# Patient Record
Sex: FEMALE | Race: WHITE | NOT HISPANIC OR LATINO | Employment: STUDENT | URBAN - METROPOLITAN AREA
[De-identification: names, ages, dates, MRNs, and addresses within clinical notes are randomized per-mention and may not be internally consistent; named-entity substitution may affect disease eponyms.]

---
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
## 2024-05-10 ENCOUNTER — MYC MEDICAL ADVICE (OUTPATIENT)
Dept: FAMILY MEDICINE | Facility: OTHER | Age: 19
End: 2024-05-10
Payer: COMMERCIAL

## 2024-05-10 DIAGNOSIS — Z11.1 SCREENING EXAMINATION FOR PULMONARY TUBERCULOSIS: Primary | ICD-10-CM

## 2024-05-14 NOTE — TELEPHONE ENCOUNTER
INCOMING FORMS    Sender:     Type of Form, letter or note (What is requested?): Health screening    How was the form received?: MyChart     How should forms be returned?:  sent patient Mychart message    Form placed in CDL bin for review/signature if appropriate.

## 2024-05-14 NOTE — TELEPHONE ENCOUNTER
Form faxed to [phone].  Copy of form sent to scanning.  Form in TC hold bin.  Mychart sent if patient wanted form back.

## 2024-05-16 NOTE — TELEPHONE ENCOUNTER
I have ordered this for her.    Chencho Zazueta PA-C  [facility]

## 2024-05-16 NOTE — TELEPHONE ENCOUNTER
Left message for pt to return our call. Can scheduled TB with MA then needs 2nd visit needs to be with an RN to read results between 48-72 hours later.

## 2024-05-16 NOTE — TELEPHONE ENCOUNTER
will you order TB testing or would you like patient to have a visit to discuss this?    Mallory Lucas RN on 5/16/2024 at 9:46 AM

## 2024-05-20 ENCOUNTER — ALLIED HEALTH/NURSE VISIT (OUTPATIENT)
Dept: FAMILY MEDICINE | Facility: CLINIC | Age: 19
End: 2024-05-20
Payer: COMMERCIAL

## 2024-05-20 DIAGNOSIS — Z11.1 SCREENING EXAMINATION FOR PULMONARY TUBERCULOSIS: Primary | ICD-10-CM

## 2024-05-20 PROCEDURE — 99207 PR NO CHARGE NURSE ONLY: CPT

## 2024-05-20 PROCEDURE — 86580 TB INTRADERMAL TEST: CPT

## 2024-05-20 NOTE — PROGRESS NOTES
"Patient is here today for a Mantoux (TST) test placement.    Is there a current order in the chart? No. Placed order according to standing order (reference the \"Skin Test- Tuberculosis Screening- Ambulatory Care\" standing order in Policy Tech). Review the Inclusion and Exclusion Criteria.        Inclusion Criteria  School or education institutional screening for healthcare workers and correctional facility staff - Administer two-step TST. Patient to return for second test in 1-3 weeks after first test is read.     Exclusion Criteria  None - Place order for Mantoux (TST) test per standing order.    Reason for Mantoux (TST) in patient's own words: shadowing doctors     Patient needs form signed? No - form not needed per patient.    Instructed patient to wait for 15 minutes post injection and to report any reactions immediately to staff.    Told patient to return to clinic in 48-72 hours to have Mantoux (TST) read.          "

## 2024-05-23 ENCOUNTER — ALLIED HEALTH/NURSE VISIT (OUTPATIENT)
Dept: FAMILY MEDICINE | Facility: CLINIC | Age: 19
End: 2024-05-23
Payer: COMMERCIAL

## 2024-05-23 DIAGNOSIS — Z11.1 SCREENING EXAMINATION FOR PULMONARY TUBERCULOSIS: Primary | ICD-10-CM

## 2024-05-23 LAB
PPDINDURATION: 0 MM (ref 0–4.99)
PPDREDNESS: NORMAL

## 2024-05-23 PROCEDURE — 99207 PR NO CHARGE NURSE ONLY: CPT

## 2024-05-23 NOTE — LETTER
May 23, 2024      Anastasia Barnard  [address] [facility] [address]        To Whom It May Concern,       Patient is here today for a Mantoux (TST) test results.    Did patient return to clinic 48-72 hours from Mantoux (TST) placement: Yes -     PPD Induration   Date Value Ref Range Status   05/23/2024 0 0 - 4.99 mm Final     PPD Redness   Date Value Ref Range Status   05/23/2024 Not Present  Final                       Sincerely,        PH Nurse

## 2024-05-23 NOTE — PROGRESS NOTES
Patient is here today for a Mantoux (TST) test results.    Did patient return to clinic 48-72 hours from Mantoux (TST) placement: Yes -     PPD Induration   Date Value Ref Range Status   05/23/2024 0 0 - 4.99 mm Final     PPD Redness   Date Value Ref Range Status   05/23/2024 Not Present  Final           Induration Size? Induration <5mm - Enter results in Enter/Edit Activity. Route results to ordering provider.     Patient needs form signed? No    Patient reports having previously had the BCG Vaccine: No    Does patient need a two step? No      Alexia Cotto,ISAURAN, RN

## 2024-07-01 ENCOUNTER — MYC MEDICAL ADVICE (OUTPATIENT)
Dept: FAMILY MEDICINE | Facility: OTHER | Age: 19
End: 2024-07-01
Payer: COMMERCIAL

## 2024-07-01 NOTE — TELEPHONE ENCOUNTER
Patient Quality Outreach    Patient is due for the following:   Physical Preventive Adult Physical    Next Steps:   Schedule a Adult Preventative    Type of outreach:    Sent [employer] message.      Questions for provider review:    None           Ericka Mc, CMA

## 2024-07-31 SDOH — HEALTH STABILITY: PHYSICAL HEALTH: ON AVERAGE, HOW MANY MINUTES DO YOU ENGAGE IN EXERCISE AT THIS LEVEL?: 60 MIN

## 2024-07-31 SDOH — HEALTH STABILITY: PHYSICAL HEALTH: ON AVERAGE, HOW MANY DAYS PER WEEK DO YOU ENGAGE IN MODERATE TO STRENUOUS EXERCISE (LIKE A BRISK WALK)?: 5 DAYS

## 2024-07-31 ASSESSMENT — SOCIAL DETERMINANTS OF HEALTH (SDOH): HOW OFTEN DO YOU GET TOGETHER WITH FRIENDS OR RELATIVES?: THREE TIMES A WEEK

## 2024-08-05 ENCOUNTER — OFFICE VISIT (OUTPATIENT)
Dept: FAMILY MEDICINE | Facility: OTHER | Age: 19
End: 2024-08-05
Payer: COMMERCIAL

## 2024-08-05 VITALS
SYSTOLIC BLOOD PRESSURE: 114 MMHG | OXYGEN SATURATION: 100 % | RESPIRATION RATE: 20 BRPM | BODY MASS INDEX: 23.7 KG/M2 | HEART RATE: 79 BPM | HEIGHT: 72 IN | DIASTOLIC BLOOD PRESSURE: 78 MMHG | TEMPERATURE: 97.6 F | WEIGHT: 175 LBS

## 2024-08-05 DIAGNOSIS — Z00.00 ROUTINE GENERAL MEDICAL EXAMINATION AT A HEALTH CARE FACILITY: Primary | ICD-10-CM

## 2024-08-05 PROCEDURE — 99395 PREV VISIT EST AGE 18-39: CPT | Performed by: PHYSICIAN ASSISTANT

## 2024-08-05 ASSESSMENT — PAIN SCALES - GENERAL: PAINLEVEL: NO PAIN (0)

## 2024-08-05 NOTE — PROGRESS NOTES
Preventive Care Visit  [facility]  Gloria Zazueta PA-C, Family Medicine  Aug 5, 2024    Assessment & Plan     ICD-10-CM    1. Routine general medical examination at a health care facility  Z00.00         Patient has been advised of split billing requirements and indicates understanding: Yes    Counseling  Appropriate preventive services were addressed with this patient via screening, questionnaire, or discussion as appropriate for fall prevention, nutrition, physical activity, Tobacco-use cessation, weight loss and cognition.  Checklist reviewing preventive services available has been given to the patient.  Reviewed patient's diet, addressing concerns and/or questions.   She is at risk for psychosocial distress and has been provided with information to reduce risk.     - Stopped her OCP, was on for about 4 years and just felt that she should stay off for awhile   - Using alternate contraception       Review of the result(s) of each unique test - none    Diagnosis or treatment significantly limited by social determinants of health - none    15 minutes spent on the date of the encounter doing chart review, history and exam, documentation and further activities as noted above    The patient indicates understanding of these issues and agrees with the plan.    Follow up: 1 year or PRN     Chencho Zazueta PA-C  [facility] Oconnor is a 18 year old, presenting for the following:  Physical        8/5/2024     8:39 AM   Additional Questions   Roomed by Tori LOVETT   Accompanied by n/a         8/5/2024     8:39 AM   Patient Reported Additional Medications   Patient reports taking the following new medications n/a        Health Care Directive  Patient does not have a Health Care Directive or Living Will: Discussed advance care planning with patient; however, patient declined at this time.    HPI        7/31/2024   General Health   How would  you rate your overall physical health? Good   Feel stress (tense, anxious, or unable to sleep) Only a little      (!) STRESS CONCERN      7/31/2024   Nutrition   Three or more servings of calcium each day? Yes   Diet: Regular (no restrictions)   How many servings of fruit and vegetables per day? (!) 2-3   How many sweetened beverages each day? 0-1            7/31/2024   Exercise   Days per week of moderate/strenous exercise 5 days   Average minutes spent exercising at this level 60 min            7/31/2024   Social Factors   Frequency of gathering with friends or relatives Three times a week   Worry food won't last until get money to buy more No   Food not last or not have enough money for food? No   Do you have housing? (Housing is defined as stable permanent housing and does not include staying ouside in a car, in a tent, in an abandoned building, in an overnight shelter, or couch-surfing.) Yes   Are you worried about losing your housing? No   Lack of transportation? No   Unable to get utilities (heat,electricity)? No            7/31/2024   Dental   Dentist two times every year? Yes               Today's PHQ-2 Score:       8/5/2024     8:33 AM   PHQ-2 ( 1999 [employer])   Q1: Little interest or pleasure in doing things 0   Q2: Feeling down, depressed or hopeless 0   PHQ-2 Score 0   Q1: Little interest or pleasure in doing things Not at all   Q2: Feeling down, depressed or hopeless Not at all   PHQ-2 Score 0           7/31/2024   Substance Use   Alcohol more than 3/day or more than 7/wk No   Do you use any other substances recreationally? No        Social History     Tobacco Use    Smoking status: Never     Passive exposure: Never    Smokeless tobacco: Never    Tobacco comments:     No smokers in home   Vaping Use    Vaping status: Never Used   Substance Use Topics    Alcohol use: Yes    Drug use: No           7/31/2024   One time HIV Screening   Previous HIV test? Yes          7/31/2024   STI Screening   New sexual  "partner(s) since last STI/HIV test? No        History of abnormal Pap smear: No - under age 21, PAP not appropriate for age             7/31/2024   Contraception/Family Planning   Questions about contraception or family planning (!) YES     Just going to use Aura Ring   Been on for 4 years, taking time off it             Reviewed and updated as needed this visit by Provider   Tobacco  Allergies  Meds  Problems  Med Hx  Surg Hx  Fam Hx            History reviewed. No pertinent past medical history.  Past Surgical History:   Procedure Laterality Date    NO HISTORY OF SURGERY       Lab work is in process  Labs reviewed in EPIC  BP Readings from Last 3 Encounters:   08/05/24 114/78   08/15/23 110/60   05/30/23 114/68    Wt Readings from Last 3 Encounters:   08/05/24 79.4 kg (175 lb) (93%, Z= 1.49)*   08/15/23 77.1 kg (170 lb) (93%, Z= 1.44)*   05/30/23 74.4 kg (164 lb) (91%, Z= 1.32)*     * Growth percentiles are based on CDC (Girls, 2-20 Years) data.                      Review of Systems  Constitutional, neuro, ENT, endocrine, pulmonary, cardiac, gastrointestinal, genitourinary, musculoskeletal, integument and psychiatric systems are negative, except as otherwise noted.     Objective    Exam  /78 (Cuff Size: Adult Regular)   Pulse 79   Temp 97.6  F (36.4  C) (Temporal)   Resp 20   Ht 1.816 m (5' 11.5\")   Wt 79.4 kg (175 lb)   LMP 08/01/2024 (Exact Date)   SpO2 100%   BMI 24.07 kg/m     Estimated body mass index is 24.07 kg/m  as calculated from the following:    Height as of this encounter: 1.816 m (5' 11.5\").    Weight as of this encounter: 79.4 kg (175 lb).    Physical Exam  Constitutional:       General: She is not in acute distress.     Appearance: She is well-developed.   HENT:      Right Ear: External ear normal. No middle ear effusion. There is no impacted cerumen. Tympanic membrane is not injected, perforated, erythematous or bulging.      Left Ear: External ear normal.  No middle ear " effusion. There is no impacted cerumen. Tympanic membrane is not injected, perforated, erythematous or bulging.      Nose: Nose normal. No mucosal edema or rhinorrhea.      Mouth/Throat:      Dentition: Normal dentition.      Tongue: No lesions. Tongue does not deviate from midline.      Palate: No lesions.      Pharynx: No pharyngeal swelling, oropharyngeal exudate or posterior oropharyngeal erythema.      Tonsils: No tonsillar exudate or tonsillar abscesses.   Eyes:      General: Lids are normal.         Right eye: No discharge.         Left eye: No discharge.      Conjunctiva/sclera: Conjunctivae normal.      Right eye: Right conjunctiva is not injected.      Left eye: Left conjunctiva is not injected.      Pupils: Pupils are equal, round, and reactive to light.   Neck:      Thyroid: No thyroid mass or thyromegaly.      Vascular: No JVD.      Trachea: Trachea normal. No tracheal deviation.   Cardiovascular:      Rate and Rhythm: Normal rate and regular rhythm.      Heart sounds: Normal heart sounds. No murmur heard.     No friction rub. No gallop.   Pulmonary:      Effort: Pulmonary effort is normal. No respiratory distress.      Breath sounds: Normal breath sounds. No stridor or decreased air movement. No wheezing, rhonchi or rales.   Abdominal:      General: Bowel sounds are normal. There is no distension.      Palpations: Abdomen is soft. There is no mass.      Tenderness: There is no abdominal tenderness. There is no guarding or rebound.      Hernia: No hernia is present.   Musculoskeletal:         General: Normal range of motion.      Cervical back: Normal range of motion and neck supple.   Lymphadenopathy:      Cervical: No cervical adenopathy.   Skin:     General: Skin is warm and dry.   Neurological:      Mental Status: She is alert and oriented to person, place, and time.   Psychiatric:         Behavior: Behavior normal.         Thought Content: Thought content normal.         Judgment: Judgment normal.          Diagnostics: none       Vision Screen  Vision Screen Details  Does the patient have corrective lenses (glasses/contacts)?: Yes  Vision Acuity Screen  Vision Acuity Tool: [place]  RIGHT EYE: 10/8 (20/16)  LEFT EYE: 10/8 (20/16)  Is there a two line difference?: No  Vision Screen Results: Pass    Hearing Screen  RIGHT EAR  1000 Hz on Level 40 dB (Conditioning sound): Pass  1000 Hz on Level 20 dB: Pass  2000 Hz on Level 20 dB: Pass  4000 Hz on Level 20 dB: Pass  6000 Hz on Level 20 dB: Pass  8000 Hz on Level 20 dB: Pass  LEFT EAR  8000 Hz on Level 20 dB: Pass  6000 Hz on Level 20 dB: Pass  4000 Hz on Level 20 dB: Pass  2000 Hz on Level 20 dB: Pass  1000 Hz on Level 20 dB: Pass  500 Hz on Level 25 dB: Pass  RIGHT EAR  500 Hz on Level 25 dB: Pass  Results  Hearing Screen Results: Pass      Signed Electronically by: Gloria Zazueta PA-C

## 2024-08-05 NOTE — PROGRESS NOTES
Preventive Care Visit  [facility]  Gloria BRUCEMaribell Zazueta PA-C, Family Medicine  Aug 5, 2024  {Provider  Link to [facility] SmartSet :462741}  Assessment & Plan   18 year old, here for preventive care.    {Diag Picklist:904496}  {Patient advised of split billing (Optional):341189}  Growth      {GROWTH:010594}    Immunizations   {Vaccine counseling is expected when vaccines are given for the first time.   Vaccine counseling would not be expected for subsequent vaccines (after the first of the series) unless there is significant additional documentation:709015}  MenB Vaccine {MenB Vaccine:333597}  { ACIP MenB Recommendations  Routine vaccination of persons aged ?10 years at increased risk for meningococcal disease (dosing schedule varies by vaccine brand; boosters should be administered at 1 year after primary series completion, then every 2-3 years thereafter)    Persons with certain medical conditions, such as anatomic or functional asplenia, complement component deficiencies, or complement inhibitor use.    Microbiologists with routine exposure to N. meningitidis isolates.    Persons at increased risk during an outbreak (e.g., in community or organizational settings, and among MSM).  MenB vaccination is not routinely recommended for all adolescents. Instead, ACIP recommends a 2-dose MenB series for persons aged 16-23 years (preferred age 16-18 years) on the basis of shared clinical decision-making.  The preferred age for MenB vaccination is 16-18 years. Booster doses are not recommended unless the person becomes at increased risk for meningococcal disease.  Booster doses for previously vaccinated persons who become or remain at increased risk.   :949026}    Anticipatory Guidance    Reviewed age appropriate anticipatory guidance.   {ANTICIPATORY 15-18 Y (Optional):637760}  {Link to Communication Management (Letters) :331354}  {Cleared for sports (Optional):219396}    Referrals/Ongoing  Specialty Care  {Referrals/Ongoing Specialty Care:153887}  Verbal Dental Referral: {C&TC REQUIRED at eruption of first tooth or 12 mo:685187}        Evi Oconnor is presenting for the following:  Physical      ***      8/5/2024     8:39 AM   Additional Questions   Accompanied by n/a           7/31/2024   Social   Lack of transportation has limited access to appts/meds No   Do you have housing? (Housing is defined as stable permanent housing and does not include staying ouside in a car, in a tent, in an abandoned building, in an overnight shelter, or couch-surfing.) Yes   Are you worried about losing your housing? No             No data to display                  9/16/2022     6:43 AM   TB Screening   Was your adolescent born outside of the [place]? No          No data to display                 Recent Labs   Lab Test 07/21/20  1149   CHOL 139   HDL 55   LDL 71   TRIG 67     {Universal Screening with fasting or non-fasting lipid panel recommended once between 17-21 yrs old  Link to Expert Panel on Integrated Guidelines for Cardiovascular Health and Risk Reduction in Children and Adolescents Summary Report :938243}      9/16/2022     6:43 AM   Diet   Do you have questions about your adolescent's eating?  No   Do you have questions about your adolescent's height or weight? No   What does your adolescent regularly drink? Water    (!) JUICE   How often does your family eat meals together? Every day   Servings of fruits/vegetables per day (!) 3-4   At least 3 servings of food or beverages that have calcium each day? Yes         7/31/2024   Diet   In past 12 months, concerned food might run out No   In past 12 months, food has run out/couldn't afford more No            7/31/2024   Activity   Days per week of moderate/strenuous exercise 5 days   On average, how many minutes do you engage in exercise at this level? 60 min           No data to display                   No data to display                   No data  "to display                   No data to display                Psycho-Social/Depression - PSC-17 required for C&TC through age 18  General screening:  {PSC :228905}  Teen Screen  {Provider  Link to Confidential Note :921868}  {Results  (18-20 YRS):378852}        9/16/2022     6:43 AM   AMB [facility] MENSES SECTION   What are your adolescent's periods like?  Regular          Objective     Exam  /78 (Cuff Size: Adult Regular)   Pulse 79   Temp 97.6  F (36.4  C) (Temporal)   Resp 20   Ht 1.816 m (5' 11.5\")   Wt 79.4 kg (175 lb)   LMP 08/01/2024 (Exact Date)   SpO2 100%   BMI 24.07 kg/m    >99 %ile (Z= 2.84) based on CDC (Girls, 2-20 Years) Stature-for-age data based on Stature recorded on 8/5/2024.  93 %ile (Z= 1.49) based on CDC (Girls, 2-20 Years) weight-for-age data using vitals from 8/5/2024.  73 %ile (Z= 0.61) based on CDC (Girls, 2-20 Years) BMI-for-age based on BMI available as of 8/5/2024.  Blood pressure %jamie are not available for patients who are 18 years or older.    Vision Screen  Vision Screen Details  Does the patient have corrective lenses (glasses/contacts)?: Yes  Vision Acuity Screen  Vision Acuity Tool: [place]  RIGHT EYE: 10/8 (20/16)  LEFT EYE: 10/8 (20/16)  Is there a two line difference?: No  Vision Screen Results: Pass    Hearing Screen  RIGHT EAR  1000 Hz on Level 40 dB (Conditioning sound): Pass  1000 Hz on Level 20 dB: Pass  2000 Hz on Level 20 dB: Pass  4000 Hz on Level 20 dB: Pass  6000 Hz on Level 20 dB: Pass  8000 Hz on Level 20 dB: Pass  LEFT EAR  8000 Hz on Level 20 dB: Pass  6000 Hz on Level 20 dB: Pass  4000 Hz on Level 20 dB: Pass  2000 Hz on Level 20 dB: Pass  1000 Hz on Level 20 dB: Pass  500 Hz on Level 25 dB: Pass  RIGHT EAR  500 Hz on Level 25 dB: Pass  Results  Hearing Screen Results: Pass  {Provider  View Vision and Hearing Results :377544}  {Reference  Recommended Vision and Hearing Follow-Up :948301}  Physical Exam  {TEEN GENERAL EXAM 9 - 18 Y:571172}  { EXAM- " Documentation REQUIRED for C&TC:175297}  {Sports Exam Musculoskeletal (Optional):347505}      Signed Electronically by: Gloria Zazueta PA-C  {Email feedback regarding this note to [email or web address]   :769457}

## 2024-08-05 NOTE — PATIENT INSTRUCTIONS
Patient Education   Preventive Care Advice   This is general advice given by our system to help you stay healthy. However, your care team may have specific advice just for you. Please talk to your care team about your preventive care needs.  Nutrition  Eat 5 or more servings of fruits and vegetables each day.  Try wheat bread, brown rice and whole grain pasta (instead of white bread, rice, and pasta).  Get enough calcium and vitamin D. Check the label on foods and aim for 100% of the RDA (recommended daily allowance).  Lifestyle  Exercise at least 150 minutes each week  (30 minutes a day, 5 days a week).  Do muscle strengthening activities 2 days a week. These help control your weight and prevent disease.  No smoking.  Wear sunscreen to prevent skin cancer.  Have a dental exam and cleaning every 6 months.  Yearly exams  See your health care team every year to talk about:  Any changes in your health.  Any medicines your care team has prescribed.  Preventive care, family planning, and ways to prevent chronic diseases.  Shots (vaccines)   HPV shots (up to age 26), if you've never had them before.  Hepatitis B shots (up to age 59), if you've never had them before.  COVID-19 shot: Get this shot when it's due.  Flu shot: Get a flu shot every year.  Tetanus shot: Get a tetanus shot every 10 years.  Pneumococcal, hepatitis A, and RSV shots: Ask your care team if you need these based on your risk.  Shingles shot (for age 50 and up)  General health tests  Diabetes screening:  Starting at age 35, Get screened for diabetes at least every 3 years.  If you are younger than age 35, ask your care team if you should be screened for diabetes.  Cholesterol test: At age 39, start having a cholesterol test every 5 years, or more often if advised.  Bone density scan (DEXA): At age 50, ask your care team if you should have this scan for osteoporosis (brittle bones).  Hepatitis C: Get tested at least once in your life.  STIs (sexually  transmitted infections)  Before age 24: Ask your care team if you should be screened for STIs.  After age 24: Get screened for STIs if you're at risk. You are at risk for STIs (including HIV) if:  You are sexually active with more than one person.  You don't use condoms every time.  You or a partner was diagnosed with a sexually transmitted infection.  If you are at risk for HIV, ask about PrEP medicine to prevent HIV.  Get tested for HIV at least once in your life, whether you are at risk for HIV or not.  Cancer screening tests  Cervical cancer screening: If you have a cervix, begin getting regular cervical cancer screening tests starting at age 21.  Breast cancer scan (mammogram): If you've ever had breasts, begin having regular mammograms starting at age 40. This is a scan to check for breast cancer.  Colon cancer screening: It is important to start screening for colon cancer at age 45.  Have a colonoscopy test every 10 years (or more often if you're at risk) Or, ask your provider about stool tests like a FIT test every year or Cologuard test every 3 years.  To learn more about your testing options, visit:   .  For help making a decision, visit:   https://bit.ly/sa57215.  Prostate cancer screening test: If you have a prostate, ask your care team if a prostate cancer screening test (PSA) at age 55 is right for you.  Lung cancer screening: If you are a current or former smoker ages 50 to 80, ask your care team if ongoing lung cancer screenings are right for you.  For informational purposes only. Not to replace the advice of your health care provider. Copyright   2023 [employer]. All rights reserved. Clinically reviewed by the [facility] Transitions Program. [employer] 317541 - REV 01/24.

## 2025-07-14 ENCOUNTER — PATIENT OUTREACH (OUTPATIENT)
Dept: CARE COORDINATION | Facility: CLINIC | Age: 20
End: 2025-07-14
Payer: COMMERCIAL